# Patient Record
Sex: FEMALE | URBAN - METROPOLITAN AREA
[De-identification: names, ages, dates, MRNs, and addresses within clinical notes are randomized per-mention and may not be internally consistent; named-entity substitution may affect disease eponyms.]

---

## 2019-11-04 ENCOUNTER — OFFICE VISIT (OUTPATIENT)
Dept: URGENT CARE | Facility: CLINIC | Age: 34
End: 2019-11-04
Payer: COMMERCIAL

## 2019-11-04 VITALS
RESPIRATION RATE: 16 BRPM | DIASTOLIC BLOOD PRESSURE: 71 MMHG | SYSTOLIC BLOOD PRESSURE: 105 MMHG | BODY MASS INDEX: 20.57 KG/M2 | HEART RATE: 84 BPM | OXYGEN SATURATION: 100 % | TEMPERATURE: 98 F | WEIGHT: 123.44 LBS | HEIGHT: 65 IN

## 2019-11-04 DIAGNOSIS — R06.02 SHORTNESS OF BREATH: ICD-10-CM

## 2019-11-04 DIAGNOSIS — R53.83 FATIGUE, UNSPECIFIED TYPE: Primary | ICD-10-CM

## 2019-11-04 DIAGNOSIS — R52 GENERALIZED BODY ACHES: ICD-10-CM

## 2019-11-04 LAB
CTP QC/QA: YES
FLUAV AG NPH QL: NEGATIVE
FLUBV AG NPH QL: NEGATIVE

## 2019-11-04 PROCEDURE — 87804 INFLUENZA ASSAY W/OPTIC: CPT | Mod: QW,S$GLB,, | Performed by: NURSE PRACTITIONER

## 2019-11-04 PROCEDURE — 99499 UNLISTED E&M SERVICE: CPT | Mod: S$GLB,,, | Performed by: NURSE PRACTITIONER

## 2019-11-04 PROCEDURE — 87804 POCT INFLUENZA A/B: ICD-10-PCS | Mod: QW,S$GLB,, | Performed by: NURSE PRACTITIONER

## 2019-11-04 PROCEDURE — 99499 NO LOS: ICD-10-PCS | Mod: S$GLB,,, | Performed by: NURSE PRACTITIONER

## 2019-11-04 RX ORDER — LEVOTHYROXINE SODIUM 150 UG/1
150 TABLET ORAL DAILY
COMMUNITY

## 2019-11-04 NOTE — PROGRESS NOTES
"Subjective:       Patient ID: Hien Holden is a 33 y.o. female.    Vitals:  height is 5' 4.57" (1.64 m) and weight is 56 kg (123 lb 7.3 oz). Her oral temperature is 98.1 °F (36.7 °C). Her blood pressure is 105/71 and her pulse is 84. Her respiration is 16 and oxygen saturation is 100%.     Chief Complaint: Shortness of Breath    Patient presents with c/o body aches, shortness of breath, non productive cough, body aches, chills, and runny nose since yesterday.  Also c/o feeling tired.  History of thyroid disease, last checked this summer.  She has not missed any of her thyroid medication.  Denies sick contacts.  Denies bloody or black stools.  Her PCP is out of town.  States that she gets these symptoms regularly for a year but noticed more so in the last 2 days.  Admits that she doesn't drink enough water.  Denies OTC use of meds or stimulant use.  Denies caffeine intake.    Shortness of Breath   This is a new problem. The current episode started yesterday. The problem occurs daily. The problem has been unchanged. Associated symptoms include rhinorrhea. Pertinent negatives include no abdominal pain, chest pain, claudication, coryza, ear pain, fever, headaches, hemoptysis, leg pain, leg swelling, neck pain, orthopnea, PND, rash, sore throat, sputum production, swollen glands, syncope, vomiting or wheezing. The patient has no known risk factors for DVT/PE. She has tried nothing for the symptoms. There is no history of asthma, CAD, COPD, DVT, a heart failure, PE or pneumonia.       Constitution: Positive for chills and fatigue. Negative for fever.   HENT: Positive for congestion. Negative for ear pain and sore throat.    Neck: Negative for neck pain and painful lymph nodes.   Cardiovascular: Positive for palpitations. Negative for chest pain, leg swelling and passing out.   Eyes: Negative for double vision and blurred vision.   Respiratory: Positive for cough and shortness of breath. Negative for sputum production, " bloody sputum and wheezing.    Gastrointestinal: Negative for abdominal pain, nausea, vomiting and diarrhea.   Genitourinary: Negative for dysuria, frequency, urgency and history of kidney stones.   Musculoskeletal: Negative for joint pain, joint swelling, muscle cramps and muscle ache.   Skin: Negative for color change, pale, rash and bruising.   Allergic/Immunologic: Negative for seasonal allergies.   Neurological: Negative for dizziness, history of vertigo, light-headedness, passing out and headaches.   Hematologic/Lymphatic: Negative for swollen lymph nodes.   Psychiatric/Behavioral: Negative for nervous/anxious, sleep disturbance and depression. The patient is not nervous/anxious.        Objective:      Physical Exam   Constitutional: She is oriented to person, place, and time. She appears well-developed and well-nourished. She is cooperative.  Non-toxic appearance. She does not appear ill. No distress.   HENT:   Head: Normocephalic and atraumatic.   Right Ear: Hearing, tympanic membrane, external ear and ear canal normal.   Left Ear: Hearing, tympanic membrane, external ear and ear canal normal.   Nose: Rhinorrhea present. No mucosal edema or nasal deformity. No epistaxis. Right sinus exhibits no maxillary sinus tenderness and no frontal sinus tenderness. Left sinus exhibits no maxillary sinus tenderness and no frontal sinus tenderness.   Mouth/Throat: Uvula is midline, oropharynx is clear and moist and mucous membranes are normal. No trismus in the jaw. Normal dentition. No uvula swelling. No oropharyngeal exudate, posterior oropharyngeal edema or posterior oropharyngeal erythema.   Eyes: Conjunctivae and lids are normal. Right eye exhibits no discharge. Left eye exhibits no discharge. No scleral icterus.   Neck: Trachea normal, normal range of motion, full passive range of motion without pain and phonation normal. Neck supple. No thyroid mass and no thyromegaly present.   Cardiovascular: Normal rate, regular  rhythm, normal heart sounds, intact distal pulses and normal pulses.   Pulmonary/Chest: Effort normal and breath sounds normal. No accessory muscle usage. No tachypnea and no bradypnea. No respiratory distress. She has no wheezes.   Abdominal: Soft. Normal appearance and bowel sounds are normal. She exhibits no distension, no pulsatile midline mass and no mass. There is no tenderness. There is no rigidity, no rebound, no guarding and no CVA tenderness.   Musculoskeletal: Normal range of motion. She exhibits no edema or deformity.   Neurological: She is alert and oriented to person, place, and time. She exhibits normal muscle tone. Coordination normal.   Skin: Skin is warm, dry, intact, not diaphoretic and not pale.   Psychiatric: She has a normal mood and affect. Her speech is normal and behavior is normal. Judgment and thought content normal. Cognition and memory are normal.   Nursing note and vitals reviewed.    EKG: normal EKG, normal sinus rhythm, HR of 73.    Results for orders placed or performed in visit on 11/04/19   POCT Influenza A/B   Result Value Ref Range    Rapid Influenza A Ag Negative Negative    Rapid Influenza B Ag Negative Negative     Acceptable Yes            Assessment:       1. Fatigue, unspecified type    2. Shortness of breath    3. Generalized body aches        Plan:         Fatigue, unspecified type  -     Ambulatory referral to Internal Medicine    Shortness of breath  -     IN OFFICE EKG 12-LEAD (to [facility])  -     Ambulatory referral to Internal Medicine    Generalized body aches  -     POCT Influenza A/B  -     Ambulatory referral to Internal Medicine      Patient Instructions   Follow up closely this week with PCP.  A referral was placed for you, call [phone] to schedule appointment.    Go to the ER for any worsening symptoms.    Rest.    Fluids.    Continue with current medications.  Shortness of Breath (Dyspnea)  Shortness of breath is the feeling that you can't catch  your breath or get enough air. It is also known as dyspnea.  Dyspnea can be caused by many different conditions. They include:  · Acute asthma attack.  · Worsening of chronic lung diseases such as chronic bronchitis and emphysema.  · Heart failure. This is when weak heart muscle allows extra fluid to collect in the lungs.  · Panic attacks or anxiety. Fear can cause rapid breathing (hyperventilation).  · Pneumonia, or an infection in the lung tissue.  · Exposure to toxic substances, fumes, smoke, or certain medicines.  · Blood clot in the lung (pulmonary embolism). This is often from a piece of blood clot in a deep vein of the leg (deep vein thrombosis) that breaks off and travels to the lungs.  · Heart attack or heart-related chest pain (angina).  · Anemia.  · Collapsed lung (pneumothorax).  · Dehydration.  · Pregnancy.  Based on your visit today, the exact cause of your shortness of breath is not certain. Your tests dont show any of the serious causes of dyspnea. You may need other tests to find out if you have a serious problem. Its important to watch for any new symptoms or symptoms that get worse. Follow up with your healthcare provider as directed.  Home care  Follow these tips to take care of yourself at home:  · When your symptoms are better, go back to your usual activities.  · If you smoke, you should stop. Join a quit-smoking program or ask your healthcare provider for help.  · Eat a healthy diet and get plenty of sleep.  · Get regular exercise. Talk with your healthcare provider before starting to exercise, especially if you have other medical problems.  · Cut down on the amount of caffeine and stimulants you consume.  Follow-up care  Follow up with your healthcare provider, or as advised.  If tests were done, you will be told if your treatment needs to be changed. You can call as directed for the results.  (Note: If an X-ray was taken, a specialist will review it. You will be notified of any new  findings that may affect your care.)  Call 911 or get immediate medical care  Shortness of breath may be a sign of a serious medical problem. For example, it may be a problem with your heart or lungs. Call 911 if you have worsening shortness of breath or trouble breathing, especially with any of the symptoms below:  · You are confused or its difficult to wake you.  · You faint or lose consciousness.  · You have a fast heartbeat, or your heartbeat is irregular.  · You are coughing up blood.  · You have pain in your chest, arm, shoulder, neck, or upper back.  · You break out in a sweat.  When to seek medical advice  Call your healthcare provider right away if any of these occur:  · Slight shortness of breath or wheezing  · Redness, pain or swelling in your leg, arm, or other body area  · Swelling in both legs or ankles  · Fast weight gain  · Dizziness or weakness  · Fever of 100.4ºF (38ºC) or higher, or as directed by your healthcare provider  Date Last Reviewed: 9/13/2015 © 2000-2017 [employer]. [address] [place] [address], [place] [address]. All rights reserved. This information is not intended as a substitute for professional medical care. Always follow your healthcare professional's instructions.

## 2019-11-04 NOTE — PATIENT INSTRUCTIONS
Follow up closely this week with PCP.  A referral was placed for you, call [phone] to schedule appointment.    Go to the ER for any worsening symptoms.    Rest.    Fluids.    Continue with current medications.  Shortness of Breath (Dyspnea)  Shortness of breath is the feeling that you can't catch your breath or get enough air. It is also known as dyspnea.  Dyspnea can be caused by many different conditions. They include:  · Acute asthma attack.  · Worsening of chronic lung diseases such as chronic bronchitis and emphysema.  · Heart failure. This is when weak heart muscle allows extra fluid to collect in the lungs.  · Panic attacks or anxiety. Fear can cause rapid breathing (hyperventilation).  · Pneumonia, or an infection in the lung tissue.  · Exposure to toxic substances, fumes, smoke, or certain medicines.  · Blood clot in the lung (pulmonary embolism). This is often from a piece of blood clot in a deep vein of the leg (deep vein thrombosis) that breaks off and travels to the lungs.  · Heart attack or heart-related chest pain (angina).  · Anemia.  · Collapsed lung (pneumothorax).  · Dehydration.  · Pregnancy.  Based on your visit today, the exact cause of your shortness of breath is not certain. Your tests dont show any of the serious causes of dyspnea. You may need other tests to find out if you have a serious problem. Its important to watch for any new symptoms or symptoms that get worse. Follow up with your healthcare provider as directed.  Home care  Follow these tips to take care of yourself at home:  · When your symptoms are better, go back to your usual activities.  · If you smoke, you should stop. Join a quit-smoking program or ask your healthcare provider for help.  · Eat a healthy diet and get plenty of sleep.  · Get regular exercise. Talk with your healthcare provider before starting to exercise, especially if you have other medical problems.  · Cut down on the amount of caffeine and stimulants you  consume.  Follow-up care  Follow up with your healthcare provider, or as advised.  If tests were done, you will be told if your treatment needs to be changed. You can call as directed for the results.  (Note: If an X-ray was taken, a specialist will review it. You will be notified of any new findings that may affect your care.)  Call 911 or get immediate medical care  Shortness of breath may be a sign of a serious medical problem. For example, it may be a problem with your heart or lungs. Call 911 if you have worsening shortness of breath or trouble breathing, especially with any of the symptoms below:  · You are confused or its difficult to wake you.  · You faint or lose consciousness.  · You have a fast heartbeat, or your heartbeat is irregular.  · You are coughing up blood.  · You have pain in your chest, arm, shoulder, neck, or upper back.  · You break out in a sweat.  When to seek medical advice  Call your healthcare provider right away if any of these occur:  · Slight shortness of breath or wheezing  · Redness, pain or swelling in your leg, arm, or other body area  · Swelling in both legs or ankles  · Fast weight gain  · Dizziness or weakness  · Fever of 100.4ºF (38ºC) or higher, or as directed by your healthcare provider  Date Last Reviewed: 9/13/2015 © 2000-2017 [employer]. [address] [place] [id number]. All rights reserved. This information is not intended as a substitute for professional medical care. Always follow your healthcare professional's instructions.